# Patient Record
Sex: FEMALE | Race: BLACK OR AFRICAN AMERICAN | NOT HISPANIC OR LATINO | Employment: PART TIME | URBAN - METROPOLITAN AREA
[De-identification: names, ages, dates, MRNs, and addresses within clinical notes are randomized per-mention and may not be internally consistent; named-entity substitution may affect disease eponyms.]

---

## 2022-04-25 ENCOUNTER — LAB REQUISITION (OUTPATIENT)
Dept: LAB | Facility: CLINIC | Age: 22
End: 2022-04-25

## 2022-04-25 PROCEDURE — 86481 TB AG RESPONSE T-CELL SUSP: CPT | Performed by: INTERNAL MEDICINE

## 2022-04-27 LAB
GAMMA INTERFERON BACKGROUND BLD IA-ACNC: 0.02 IU/ML
M TB IFN-G BLD-IMP: NEGATIVE
M TB IFN-G CD4+ BCKGRND COR BLD-ACNC: 9.98 IU/ML
MITOGEN IGNF BCKGRD COR BLD-ACNC: 0.01 IU/ML
MITOGEN IGNF BCKGRD COR BLD-ACNC: 0.02 IU/ML
QUANTIFERON MITOGEN: 10 IU/ML
QUANTIFERON NIL TUBE: 0.02 IU/ML
QUANTIFERON TB1 TUBE: 0.03 IU/ML
QUANTIFERON TB2 TUBE: 0.04

## 2022-06-02 ENCOUNTER — LAB REQUISITION (OUTPATIENT)
Dept: LAB | Facility: CLINIC | Age: 22
End: 2022-06-02

## 2022-06-02 LAB — SARS-COV-2 RNA RESP QL NAA+PROBE: NEGATIVE

## 2022-06-02 PROCEDURE — U0005 INFEC AGEN DETEC AMPLI PROBE: HCPCS | Performed by: INTERNAL MEDICINE

## 2022-06-08 ENCOUNTER — LAB REQUISITION (OUTPATIENT)
Dept: LAB | Facility: CLINIC | Age: 22
End: 2022-06-08

## 2022-06-08 PROCEDURE — U0005 INFEC AGEN DETEC AMPLI PROBE: HCPCS | Performed by: INTERNAL MEDICINE

## 2022-06-09 LAB — SARS-COV-2 RNA RESP QL NAA+PROBE: NEGATIVE

## 2022-09-22 ENCOUNTER — HOSPITAL ENCOUNTER (EMERGENCY)
Facility: HOSPITAL | Age: 22
Discharge: HOME | End: 2022-09-22
Admitting: NURSE PRACTITIONER
Payer: COMMERCIAL

## 2022-09-22 VITALS
HEIGHT: 66 IN | TEMPERATURE: 98.2 F | HEART RATE: 83 BPM | DIASTOLIC BLOOD PRESSURE: 71 MMHG | BODY MASS INDEX: 30.05 KG/M2 | RESPIRATION RATE: 18 BRPM | WEIGHT: 187 LBS | SYSTOLIC BLOOD PRESSURE: 146 MMHG | OXYGEN SATURATION: 100 %

## 2022-09-22 DIAGNOSIS — Z20.822 PERSON UNDER INVESTIGATION FOR COVID-19: ICD-10-CM

## 2022-09-22 PROBLEM — U07.1 COVID-19 AFFECTING PREGNANCY IN FIRST TRIMESTER: Status: ACTIVE | Noted: 2022-06-20

## 2022-09-22 PROBLEM — O98.511 COVID-19 AFFECTING PREGNANCY IN FIRST TRIMESTER: Status: ACTIVE | Noted: 2022-06-20

## 2022-09-22 PROCEDURE — 99281 EMR DPT VST MAYX REQ PHY/QHP: CPT

## 2022-09-22 NOTE — ED PROVIDER NOTES
EMERGENCY DEPARTMENT ENCOUNTER      NAME: Radha Barnett  AGE: 22 year old female  YOB: 2000  MRN: [id number]  EVALUATION DATE & TIME: No admission date for patient encounter.    PCP: No primary care provider on file.    ED PROVIDER: GAB Guillaume, CNP      Chief Complaint   Patient presents with     Flu Symptoms         FINAL IMPRESSION:  1. Person under investigation for COVID-19          ED COURSE & MEDICAL DECISION MAKIN:45 AM I met with the patient, obtained history, performed an initial exam, and discussed options and plan for treatment here in the ED.  9:45 AM case discussed with pharmacy. Recommends paxlovid as the preferred treatment in pregnancy. Went to update patient, was not present in the waiting room.  9:57 AM went to find patient in [facility]. Informed by staff that she left after signing AMA form.     Pertinent Labs & Imaging studies reviewed. (See chart for details)  22 year old female presents to the Emergency Department for evaluation of covid symptoms. Positive home covid test. Well appearing. BP elevated. Otherwise reassuring vital signs. She left prior to her full evaluation. I had planned on discussing PCR results and treatment options. I suspect that she left due to wait times.     MEDICATIONS GIVEN IN THE EMERGENCY:  Medications - No data to display    NEW PRESCRIPTIONS STARTED AT TODAY'S ER VISIT  New Prescriptions    No medications on file            =================================================================    HPI    Patient information was obtained from: Patient    Use of Intrepreter: N/A        Radha Barnett is a 22 year old female with a history of  who presents with covid symptoms. Symptoms began 2 days ago. Currently 38 weeks pregnant. Currently has sore throat, congestion, rhinorrhea, watery eyes and cough. Denies fever. Tested at home and line was faintly positive. No vaginal bleeding, discharge or cramping. Did receive covid vaccinations  "in March and April 2022.      REVIEW OF SYSTEMS   All systems reviewed and negative except as noted in HPI.         PAST MEDICAL HISTORY:  No past medical history on file.    PAST SURGICAL HISTORY:  Past Surgical History:   Procedure Laterality Date     TONSILLECTOMY  02/04/2017           CURRENT MEDICATIONS:    No current facility-administered medications for this encounter.     Current Outpatient Medications   Medication     albuterol (PROAIR HFA;PROVENTIL HFA;VENTOLIN HFA) 90 mcg/actuation inhaler     CRYSELLE, 28, 0.3-30 mg-mcg per tablet         ALLERGIES:  No Known Allergies    FAMILY HISTORY:  Family History   Problem Relation Age of Onset     Fibromyalgia Mother      Coronary Artery Disease No family hx of      Cerebrovascular Disease No family hx of      Pulmonary Embolism No family hx of        SOCIAL HISTORY:   Social History     Socioeconomic History     Marital status: Single   Tobacco Use     Smoking status: Never Smoker   Substance and Sexual Activity     Alcohol use: No     Drug use: No         VITALS:  Patient Vitals for the past 24 hrs:   BP Temp Temp src Pulse Resp SpO2 Height Weight   09/22/22 0817 (!) 146/71 98.2  F (36.8  C) Temporal 83 18 100 % 1.664 m (5' 5.5\") 84.8 kg (187 lb)       PHYSICAL EXAM    Constitutional:  Well developed, well nourished, no acute distress  EYES: Conjunctivae clear  HENT:  Atraumatic, normocephalic. Mild injection of the oropharynx. TM's appear normal. No meningismus.  Respiratory:  No respiratory distress, normal breath sounds  Cardiovascular:  Normal rate, normal rhythm, no murmurs   Integument: Warm, Dry  Neurologic:  Alert & oriented x 3              LAB:  All pertinent labs reviewed and interpreted.  Labs Ordered and Resulted from Time of ED Arrival to Time of ED Departure - No data to display      RADIOLOGY:  Reviewed all pertinent imaging. Please see official radiology report.  No orders to display             PROCEDURES:   None      Elizabeth ALVAREZ am " serving as a scribe to document services personally performed by Javi Nair CNP. based on my observation and the provider's statements to me. I, Javi Nair CNP attest that Elizabeth Mane is acting in a scribe capacity, has observed my performance of the services and has documented them in accordance with my direction.    GAB Guillaume, CNP  Emergency Medicine  [facility] EMERGENCY DEPARTMENT  [address] [place] [id number]  [phone]  Dept: [phone]         Javi Nair APRN CNP  09/22/22 0959

## 2022-09-22 NOTE — ED TRIAGE NOTES
Patient c/o sore throat fever, congestion and watery eyes that started on Tuesday. Patient did take covid test this morning which had 2 lines but 1 line was really faint. Patient is currently pregnant.     Triage Assessment     Row Name 09/22/22 0878       Triage Assessment (Adult)    Airway WDL WDL       Respiratory WDL    Respiratory WDL WDL       Skin Circulation/Temperature WDL    Skin Circulation/Temperature WDL WDL       Cardiac WDL    Cardiac WDL WDL       Peripheral/Neurovascular WDL    Peripheral Neurovascular WDL WDL       Cognitive/Neuro/Behavioral WDL    Cognitive/Neuro/Behavioral WDL WDL